# Patient Record
Sex: FEMALE | Race: WHITE | NOT HISPANIC OR LATINO
[De-identification: names, ages, dates, MRNs, and addresses within clinical notes are randomized per-mention and may not be internally consistent; named-entity substitution may affect disease eponyms.]

---

## 2023-11-17 ENCOUNTER — TRANSCRIBE ORDERS (OUTPATIENT)
Dept: OTHER | Age: 33
End: 2023-11-17

## 2023-11-17 DIAGNOSIS — Z84.89 FAMILY HISTORY OF BRAIN TUMOR: Primary | ICD-10-CM

## 2023-11-27 ENCOUNTER — PATIENT OUTREACH (OUTPATIENT)
Dept: ONCOLOGY | Facility: CLINIC | Age: 33
End: 2023-11-27

## 2023-11-27 ENCOUNTER — TRANSCRIBE ORDERS (OUTPATIENT)
Dept: ONCOLOGY | Facility: CLINIC | Age: 33
End: 2023-11-27

## 2023-11-27 DIAGNOSIS — Z84.89 FAMILY HISTORY OF BRAIN TUMOR: Primary | ICD-10-CM

## 2023-11-27 NOTE — PROGRESS NOTES
Writer received referral to Cancer Risk Management/Genetic Counseling.    Referred for:  Per neurology note: Referral to genetic counselor [facility] given bilateral vestibular schwannoma in mother,  11/9/23    Awaiting faxed referral to review for appropriate.    12/4/23  I reached out to Bessy Tolbert GC, with NF clinic, regarding where this patient should be seen.  Ideally, they would like the patient's mother to see cancer genetics for testing, but they will coordinate for visit for Manav to see Bessy and Dr. Long.    I placed a call and LM for Manav to call me back for discussion if there is possibility for her mother to be tested.    12/5/23  I was able to connect with Manav.  I discussed the recommendations for her mother to see an oncology genetic counselor.  She feels her mother would be open to this and will talk with her.  Mom lives in [place] though.  I asked that if mother goes forward with counseling/testing, that Manav will keep the team updated with any findings.  I also asked Manav to return the call that the scheduling team left. She has no other questions at this time.    Fatimah Rosales, RN, BSN  Oncology New Patient Nurse Navigator   [facility]  [phone]

## 2024-05-01 ENCOUNTER — VIRTUAL VISIT (OUTPATIENT)
Dept: PEDIATRIC HEMATOLOGY/ONCOLOGY | Facility: CLINIC | Age: 34
End: 2024-05-01
Attending: PEDIATRICS
Payer: COMMERCIAL

## 2024-05-01 DIAGNOSIS — Z71.83 ENCOUNTER FOR NONPROCREATIVE GENETIC COUNSELING AND TESTING: Primary | ICD-10-CM

## 2024-05-01 DIAGNOSIS — Z84.89 FAMILY HISTORY OF GENETIC DISEASE: Primary | ICD-10-CM

## 2024-05-01 DIAGNOSIS — Z13.71 ENCOUNTER FOR NONPROCREATIVE GENETIC COUNSELING AND TESTING: Primary | ICD-10-CM

## 2024-05-01 DIAGNOSIS — Z84.81 FAMILY HISTORY OF GENETIC DISEASE CARRIER: ICD-10-CM

## 2024-05-01 PROCEDURE — 96040 HC GENETIC COUNSELING, EACH 30 MINUTES: CPT | Mod: GT,95

## 2024-05-01 PROCEDURE — 99202 OFFICE O/P NEW SF 15 MIN: CPT | Mod: 95 | Performed by: PEDIATRICS

## 2024-05-01 NOTE — LETTER
5/1/2024      RE: Manav Mir  [address] [place] [address]     Dear Colleague,    Thank you for the opportunity to participate in the care of your patient, Manav Mir, at the [facility] at [facility]. Please see a copy of my visit note below.    HPI  Manav Mir is a 33 year old with a family history of bilateral acoustic neuromas (in mother) who presents to the clinic following a visit with Dr. Roland Jack on 11/09/2023.    Manav is here to get some clarity regarding her mother's current health situation. Her mother has been ill with a number of neurological issues recently. About 2-3 years ago, her mother began experiencing vertigo. She had difficulty walking up and down the stairs and had continuous balance issues. Shortly after, she experienced a seizure and was taken to [facility] for treatment. Her scans revealed bilateral vestibular schwannomas, although providers said they were not significant enough to cause vertigo, balance issues, and seizures. They then considered the symptoms to be aligned with meniere's disease. There are no other known tumors. She has an enlarged amygdala and deals with heightened levels of anxiety. Manav's mother currently deals with epilepsy and is searching for the appropriate medicine to provide her with some relief.     Manav herself does not have any health issues aside from migraines. 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 She takes Triptan and Nurtec for migraines relief. She notes that the migraines could be cluster headaches.     Manav's biological sister is 38 years old and currently has no health issues.     Fam/soc: She works as a pharmacist at [employer].    Impression:  FHx of bilateral acoustic neuromas     Plan:   Will try to schedule an appointment with Manav's mother.  Discussed less expensive genetic  testing through [facility].  AVS sent through [facility].      F/U with patient's mother for genetic testing.     Video start: 10:20 AM  Video end: 10:36 AM    Total time spent on the following services on the date of the encounter:  Preparing to see patient, chart review, review of outside records, Referring or communicating with other healthcare professionals, Interpretation of labs, imaging and other tests, Performing a medically appropriate examination , Documenting clinical information in the electronic or other health record  and Total time spent: 15 minutes    Tia BARRIOS am working as a scribe for and in the presence of Dr. Long on May 1, 2024. Dr. Long performed the services described in this note and the note is both complete and accurate.     Adrian Long MD

## 2024-05-01 NOTE — PROGRESS NOTES
Name:  Manav Mir  :   1990  MRN:   [id number]  Date of service: May 1, 2024  Referring Provider: Xiomara Salgado    Genetic Counseling Consultation Note - Neurofibrom[facility]    Presenting Information  A genetic counseling consultation was requested for Manav, a 33 year old female, for evaluation of possible NF2 in the context of a family history of vestibular schwannoma.  Manav attended this video visit alone.    I met with her at the request of Dr. Long to discuss personal and family medical history, review possible genetic contributions to her symptoms, and to obtain informed consent for genetic testing, if indicated. History is obtained from Manav and review of the medical record.     ----------------------------------------------------    Plan  At today's visit, we discussed the following plan:   No genetic testing recommended at today's visit. I have encouraged Manav to have her mother reach out to me as affected family member testing will have the most utility.  Other follow up and evaluation per Dr. Long.     ----------------------------------------------------    Personal History  For additional details, review note from Dr. Long dated 2024.  To summarize, Manav has a history of the following:    There is no problem list on file for this patient.    No past medical history on file.      Pregnancy/ History  No reported concerns. History of meningitis in infancy but no other illnesses/hospitalizations.     Relevant Imaging & Workup  No relevant imaging.   No relevant laboratory tests.     Previous Genetic Testing  No previous genetic testing for this patient.   No previous genetic testing in the family. Patient's parent deferred genetic testing due to insurance denial.    Social History  Father available for testing: Yes  Mother available for testing: Yes  Full sibling available for testing: Yes   Half sibling available for testing: NA    Family  "History  A standard three generation pedigree was obtained and is scanned into the medical record.      Children: Manav does not have children.  Siblings: Manav has an older sister, age 38, who is living and well. She had some infertility concerns but is otherwise well. She has two children, one of whom has epilepsy diagnosed at age 6 (he is now 12).   Paternal:  Father: Living at age 62; history of congenital unilateral hearing loss which was worsened after a serious motor vehicle accident. Vision was also impacted after that accident.   Paternal grandfather: History of the same unilateral hearing loss as Manav's father. Currently in his 80s with no reported health concerns.  Paternal grandmother: Living in her 80s; history of \"swelling around the heart\" at around age 80 after a cold/infection.   Paternal relatives: Two uncles, one aunt. The aunt has the same unilateral hearing loss as Manav's father. One of the uncles has a son who's own son (Manav's cousin's son) has autism spectrum disorder and was diagnosed with epilepsy. He is approximately 10 now.     Maternal:  Mother:  Living at age 61. Bilateral vestibular schwannomas identified on MRI. No genetic testing for this individual yet. She also has a history of parathyroidism, liver cysts, Meniere's disease, and a seizure incident in her late 50s that prompted her MRI.   Maternal grandfather:  in his late 60s due to esophageal caner; he was a smoker.   Maternal grandmother: Living at approximately 85; she has a history of kidney concerns, a weakened aorta, and COPD. She was a smoker.   Maternal relatives: Two uncles, one aunt. Aunt is in her mid-60s and has a history of skin cancer. Uncles are in their late 50s and both have some degree of developmental delay.  The younger of the two uncles has schizophrenia, bipolar disorder, and intellectual disability. He lives in a group home.   The older of the two uncles lives independently but has a history of " "developmental disability and bipolar disorder.    There are no additional reports of family members with schwannomas or other features suggestive of NF2. Consanguinity is denied.     Background Information  Every cell in our body contains a complete set of the instructions that our body needs to function.  These instructions come in the form of our DNA, or long, double-stranded chains of chemical compounds. Portions of DNA that code for a specific product or have a known function are called genes.       Since there's so much information that goes into human functioning and since every tiny cell needs a complete set, our DNA is tightly wound into compact structures called chromosomes. Most people have 46 chromosomes, with 23 coming from each parent. The 23rd pair are sometimes referred to as the \"sex chromosomes\", as they typically determine biological sex development (XX and XY). Sometimes, changes to chromosomes (like deleted pieces, duplicated pieces, or entire extra chromosomes) can cause genetic instructions to no longer work. When this occurs, a genetic disorder is possible. This type of change is called a copy number variant, because a person would not have the expected number (two) of copies of a gene.     Genetic disorders can also be caused by a single change in a single gene, like a typo in a word. These may be called point mutations, missense variants, or nonsense variants; the name usually depends on the effect the change has on the body's instructions. The genetic disorders caused by this type of change are often called single gene disorders.     Genetic changes can come from either parent or be brand new in a person. When a change is brand new in an individual, it's called a de christie change. For some conditions, both copies of a gene (both the one from mother and the one from father) need to be altered to show a trait or disease. This is called autosomal recessive inheritance. Other conditions just " require one copy of a gene to be changed in order to show a trait or disease. This is called autosomal dominant inheritance.     Neurofibromatosis Type 2 & Other Schwannomatosis  Neurofibromatosis type 2 (NF2) is a progressive autosomal dominant disorder which predisposes to schwannomas, meningiomas, and ependymomas.  The hallmark feature of NF2 is bilateral vestibular schwannomas which progressively enlarge leading to sensorineural hearing loss and deafness. Learning disabilities are uncommon.    A clinical diagnosis of NF2, initially developed by the NIH, is established by the following criteria (modified by Fox et al in 2000 and Leopoldo sanchez al in 2022):    Clinical criteria of NF2  Bilateral vestibular schwannomas (VS)  An identical NF2 pathogenic variant in at least 2 anatomically distinct NF2-related tumors (schwannoma, meningioma, ependymoma)  2 major or 1 major with 2 minor criteria:  Major - unilateral VS, first-degree relative other than sibling with NF2-related schwannomatosis, 2+ meningiomas (single meningioma is a minor criteria), or NF2 pathogenic variant in unaffected tissue such as blood  Minor - Ependymoma, schwannoma, meningioma (multiple meningiomas are a MAJOR) can be counted as distinct minor criteria (ie two distinct schwannomas = 2 minor criteria). Juvenile subcapsular or cortical cataract, retinal hamartoma, epiretinal membrane in individuals under age 40.    Occasionally, schwannomas can develop that are not related to NF2. Other genes involved in schwannoma development include SMARCB1 and LZTR1. Meningiomas can develop in individuals with SMARCB1-related schwannomatosis, but have not been observed in LZTR1-related schwannomatosis.     Clinical criteria of schwannomatosis (SMARCB1, LZTR1)   Patient must meet at least 1 of the following criteria:    At least one pathologically confirmed schwannoma or hybrid nerve sheath tumor and a SMARCB1 or LZTR1 pathogenic variant in an unaffected tissue  (such as blood)  A shared SMARCB1 or LZTR1 pathogenic variant in 2 schwannomas or hybrid nerve sheath tumors        As the average age of onset of findings in individuals with NF2 is 18 to 24 years, ongoing surveillance for tumors and hearing loss is needed to allow for early diagnosis and intervention.  Because NF2 is considered to be an adult-onset disease, it may be under-recognized in children, in whom skin tumors or ocular findings may be the initial manifestations.      MRI screening for at risk individuals is typically not recommended until age 10, unless needed for diagnostic purposes, to assess concerning symptoms or when the individual is known to be a member of a severely affected family.    Initial symptoms of vestibular schwannoma include tinnitus, hearing loss, and balance dysfunction. With time, vestibular tumors extend medially into the cerebellar pontine angle and, if left untreated, cause compression of the brain stem and hydrocephalus. Schwannomas may also develop on other cranial and peripheral nerves, with sensory nerves more frequently affected than motor nerves.     At least two-thirds of individuals with NF2 develop spinal tumors, which are often the most devastating and difficult aspects of this condition to manage. The most common spinal tumors are schwannomas, which usually originate within the intravertebral canal on the dorsal root and extend both medially and laterally, taking the shape of a dumbbell. Intramedullary tumors of the spinal cord such as astrocytoma and ependymoma occur in 5-33% of individuals with NF2. Most individuals with spinal cord involvement have multiple tumors identified on imaging studies, but these tumors can remain asymptomatic.    Approximately half of individuals with NF2 develop maeningiomas. Most are intracranial; however, spinal meningiomas also occur. Meningiomas in the orbit may compress the optic nerve and result in visual loss. Meningioma may be the  presenting feature of NF2, particularly in childhood.    One-third of individuals with NF2 have decreased visual acuity in one or both eyes. Posterior subcapsular lens opacities, rarely progressing to a visually significant cataract,  are the most common ocular finding. Lens opacities may appear prior to the onset of symptoms from vestibular schwannoma and can be seen in children. Retinal hamartoma and epiretinal membrane are seen in up to one-third of individuals with NF2.     An increasingly recognized feature of NF2 is a mononeuropathy occurring particularly in childhood and frequently presenting as a facial palsy that usually only partially recovers, a third nerve palsy or a foot or hand drop. A progressive polyneuropathy of adulthood not directly related to tumor masses is also being increasingly described.    NF2 is caused by a pathogenic variant, or mutation, in the NF2 gene.  Genetic testing for NF2 is available and currently detects the disease-causing mutation in 93% of individuals with a clinical diagnosis of NF2.  Therefore, if a pathogenic variant is identified, a diagnosis is confirmed.  However, if no pathogenic variant is found, a diagnosis of NF2 cannot be completely excluded.  Somatic mosaicism is common in NF2 and therefore, testing is usually most informative in non- patients or in tumor sample.    As this is an autosomal dominant condition, affected individuals have a 50% chance to pass the condition to their offspring.  There is significant variability in NF2.    Genetic Testing  Genetic testing can take many forms. We can look at chromosomes to see if there are any pieces missing or extra (microarray test) or see how chromosomes are arranged (karyotype test). We can also look at specific genes to see if there are changes to the sequence causing the instruction to no longer work (sequencing, deletion or duplication analysis). Often, we look at multiple genes at once (a panel test).  Sometimes, we look at every known gene within a person via a test called whole exome sequencing (AISHA). Typically, with schwannomas, we analyze sequence and deletion/duplication of three genes: NF2, LZTR1, SMARCB1.     At this time, testing will be minimally informative without a positive genetic change identified in Manav's mother. I have requested that Manav share my contact information with her mother; we can attempt to coordinate testing for her following that.    Manav expressed an excellent understanding of this information and agreed to the plan as set forth by Dr. Long and I and detailed at the beginning of this note. It was a pleasure meeting with Manav today. She vocalized understanding of the information we discussed and her questions and concerns were addressed. She has been provided with my contact information should any questions arise regarding our visit or plan moving forward. In total, I spent 25 minutes in face-to-face counseling with this patient.     Bessy Tolbert MS, [facility]  Genetic Counselor - [facility]  Phone: [phone]  Email: [email or web address]    Lab results may be automatically released via [employer].  Department protocol is to hold genetic testing results until we have reviewed them. We will then contact the family directly to disclose the results and ensure they receive a copy of the report. This protocol was reviewed with the family, who were in agreement to hold the results for genetics review and direct contact.    References  Corrina R, Leopoldo S, Yg A, et al. LZTR1- and SMARCB1-Related Schwannomatosis. 2018 Mar 8 [Updated 2023 Jul 27]. In: Murray Villa MP, GM, Haylee CASANOVA, et al., editors. Lingua.ly  [Internet]. [place] ([place]): [facility]; 9836-7581. Available from: https://www.ncbi.nlm.nih.gov/books/WUU481289/    Gerber JIMENEZ. NF2-Related Schwannomatosis. 1998 Oct 14 [Updated 2023 Apr 20]. In: Murray Villa MP, GM, Haylee CASANOVA et al., editors. OpenSparks[employer]ews   [Internet]. [place] ([place]): [facility]; 9415-3438. Available from: https://www.ncbi.nlm.nih.gov/books/NCY6913/    Leopoldo SR, Keisha MIX, Charu E, Jimbo P, Leoncio RA, Sixto HERNANDEZ, Jose D, Chin R, Edi MJ, Perez JM, Allyson M, Alejandro DH, Joy CO, Jarrod M, Kehrer-Sawatzki H, Cleo BR, Vani VF, Albert M, Alfred L, Levi KA, Alexey V, Schnic E, Pratik MJ, Surinder A, Jj DA, Ullrich NJ, Malachi D, Jaquelin K, Chidi K; International Consensus Group on Neurofibromatosis Diagnostic Criteria (I-NF-DC); Wendy SM, Franco P, Gerber DG. Updated diagnostic criteria and nomenclature for neurofibromatosis type 2 and schwannomatosis: An international consensus recommendation. [employer] Med. 2022 Sep;24(9):0585-3120. doi: 10.1016/j.gim.2022.05.007. Epub 2022 Jun 9. PMID: [id number].

## 2024-05-01 NOTE — LETTER
2024      RE: Manav Mir  [address] [facility] [address]     Dear Colleague,    Thank you for the opportunity to participate in the care of your patient, Manav Mir, at the [facility] at [facility]. Please see a copy of my visit note below.    Name:  Manav Mir  :   1990  MRN:   [id number]  Date of service: May 1, 2024  Referring Provider: Xiomara Salgado    Genetic Counseling Consultation Note - [facility]    Presenting Information  A genetic counseling consultation was requested for Manav, a 33 year old female, for evaluation of possible NF2 in the context of a family history of vestibular schwannoma.  Manav attended this video visit alone.    I met with her at the request of Dr. Long to discuss personal and family medical history, review possible genetic contributions to her symptoms, and to obtain informed consent for genetic testing, if indicated. History is obtained from Manav and review of the medical record.     ----------------------------------------------------    Plan  At today's visit, we discussed the following plan:   No genetic testing recommended at today's visit. I have encouraged Manav to have her mother reach out to me as affected family member testing will have the most utility.  Other follow up and evaluation per Dr. Long.     ----------------------------------------------------    Personal History  For additional details, review note from Dr. Long dated 2024.  To summarize, Manav has a history of the following:    There is no problem list on file for this patient.    No past medical history on file.      Pregnancy/ History  No reported concerns. History of meningitis in infancy but no other illnesses/hospitalizations.     Relevant Imaging & Workup  No relevant imaging.   No relevant laboratory tests.     Previous Genetic  "Testing  No previous genetic testing for this patient.   No previous genetic testing in the family. Patient's parent deferred genetic testing due to insurance denial.    Social History  Father available for testing: Yes  Mother available for testing: Yes  Full sibling available for testing: Yes   Half sibling available for testing: NA    Family History  A standard three generation pedigree was obtained and is scanned into the medical record.      Children: Manav does not have children.  Siblings: Manav has an older sister, age 38, who is living and well. She had some infertility concerns but is otherwise well. She has two children, one of whom has epilepsy diagnosed at age 6 (he is now 12).   Paternal:  Father: Living at age 62; history of congenital unilateral hearing loss which was worsened after a serious motor vehicle accident. Vision was also impacted after that accident.   Paternal grandfather: History of the same unilateral hearing loss as Manav's father. Currently in his 80s with no reported health concerns.  Paternal grandmother: Living in her 80s; history of \"swelling around the heart\" at around age 80 after a cold/infection.   Paternal relatives: Two uncles, one aunt. The aunt has the same unilateral hearing loss as aMnav's father. One of the uncles has a son who's own son (Manav's cousin's son) has autism spectrum disorder and was diagnosed with epilepsy. He is approximately 10 now.     Maternal:  Mother:  Living at age 61. Bilateral vestibular schwannomas identified on MRI. No genetic testing for this individual yet. She also has a history of parathyroidism, liver cysts, Meniere's disease, and a seizure incident in her late 50s that prompted her MRI.   Maternal grandfather:  in his late 60s due to esophageal caner; he was a smoker.   Maternal grandmother: Living at approximately 85; she has a history of kidney concerns, a weakened aorta, and COPD. She was a smoker.   Maternal relatives: Two " "uncles, one aunt. Aunt is in her mid-60s and has a history of skin cancer. Uncles are in their late 50s and both have some degree of developmental delay.  The younger of the two uncles has schizophrenia, bipolar disorder, and intellectual disability. He lives in a group home.   The older of the two uncles lives independently but has a history of developmental disability and bipolar disorder.    There are no additional reports of family members with schwannomas or other features suggestive of NF2. Consanguinity is denied.     Background Information  Every cell in our body contains a complete set of the instructions that our body needs to function.  These instructions come in the form of our DNA, or long, double-stranded chains of chemical compounds. Portions of DNA that code for a specific product or have a known function are called genes.       Since there's so much information that goes into human functioning and since every tiny cell needs a complete set, our DNA is tightly wound into compact structures called chromosomes. Most people have 46 chromosomes, with 23 coming from each parent. The 23rd pair are sometimes referred to as the \"sex chromosomes\", as they typically determine biological sex development (XX and XY). Sometimes, changes to chromosomes (like deleted pieces, duplicated pieces, or entire extra chromosomes) can cause genetic instructions to no longer work. When this occurs, a genetic disorder is possible. This type of change is called a copy number variant, because a person would not have the expected number (two) of copies of a gene.     Genetic disorders can also be caused by a single change in a single gene, like a typo in a word. These may be called point mutations, missense variants, or nonsense variants; the name usually depends on the effect the change has on the body's instructions. The genetic disorders caused by this type of change are often called single gene disorders.     Genetic changes " can come from either parent or be brand new in a person. When a change is brand new in an individual, it's called a de christie change. For some conditions, both copies of a gene (both the one from mother and the one from father) need to be altered to show a trait or disease. This is called autosomal recessive inheritance. Other conditions just require one copy of a gene to be changed in order to show a trait or disease. This is called autosomal dominant inheritance.     Neurofibromatosis Type 2 & Other Schwannomatosis  Neurofibromatosis type 2 (NF2) is a progressive autosomal dominant disorder which predisposes to schwannomas, meningiomas, and ependymomas.  The hallmark feature of NF2 is bilateral vestibular schwannomas which progressively enlarge leading to sensorineural hearing loss and deafness. Learning disabilities are uncommon.    A clinical diagnosis of NF2, initially developed by the NIH, is established by the following criteria (modified by Fox et al in 2000 and Leopoldo sanchez al in 2022):    Clinical criteria of NF2  Bilateral vestibular schwannomas (VS)  An identical NF2 pathogenic variant in at least 2 anatomically distinct NF2-related tumors (schwannoma, meningioma, ependymoma)  2 major or 1 major with 2 minor criteria:  Major - unilateral VS, first-degree relative other than sibling with NF2-related schwannomatosis, 2+ meningiomas (single meningioma is a minor criteria), or NF2 pathogenic variant in unaffected tissue such as blood  Minor - Ependymoma, schwannoma, meningioma (multiple meningiomas are a MAJOR) can be counted as distinct minor criteria (ie two distinct schwannomas = 2 minor criteria). Juvenile subcapsular or cortical cataract, retinal hamartoma, epiretinal membrane in individuals under age 40.    Occasionally, schwannomas can develop that are not related to NF2. Other genes involved in schwannoma development include SMARCB1 and LZTR1. Meningiomas can develop in individuals with SMARCB1-related  schwannomatosis, but have not been observed in LZTR1-related schwannomatosis.     Clinical criteria of schwannomatosis (SMARCB1, LZTR1)   Patient must meet at least 1 of the following criteria:    At least one pathologically confirmed schwannoma or hybrid nerve sheath tumor and a SMARCB1 or LZTR1 pathogenic variant in an unaffected tissue (such as blood)  A shared SMARCB1 or LZTR1 pathogenic variant in 2 schwannomas or hybrid nerve sheath tumors        As the average age of onset of findings in individuals with NF2 is 18 to 24 years, ongoing surveillance for tumors and hearing loss is needed to allow for early diagnosis and intervention.  Because NF2 is considered to be an adult-onset disease, it may be under-recognized in children, in whom skin tumors or ocular findings may be the initial manifestations.      MRI screening for at risk individuals is typically not recommended until age 10, unless needed for diagnostic purposes, to assess concerning symptoms or when the individual is known to be a member of a severely affected family.    Initial symptoms of vestibular schwannoma include tinnitus, hearing loss, and balance dysfunction. With time, vestibular tumors extend medially into the cerebellar pontine angle and, if left untreated, cause compression of the brain stem and hydrocephalus. Schwannomas may also develop on other cranial and peripheral nerves, with sensory nerves more frequently affected than motor nerves.     At least two-thirds of individuals with NF2 develop spinal tumors, which are often the most devastating and difficult aspects of this condition to manage. The most common spinal tumors are schwannomas, which usually originate within the intravertebral canal on the dorsal root and extend both medially and laterally, taking the shape of a dumbbell. Intramedullary tumors of the spinal cord such as astrocytoma and ependymoma occur in 5-33% of individuals with NF2. Most individuals with spinal cord  involvement have multiple tumors identified on imaging studies, but these tumors can remain asymptomatic.    Approximately half of individuals with NF2 develop maeningiomas. Most are intracranial; however, spinal meningiomas also occur. Meningiomas in the orbit may compress the optic nerve and result in visual loss. Meningioma may be the presenting feature of NF2, particularly in childhood.    One-third of individuals with NF2 have decreased visual acuity in one or both eyes. Posterior subcapsular lens opacities, rarely progressing to a visually significant cataract,  are the most common ocular finding. Lens opacities may appear prior to the onset of symptoms from vestibular schwannoma and can be seen in children. Retinal hamartoma and epiretinal membrane are seen in up to one-third of individuals with NF2.     An increasingly recognized feature of NF2 is a mononeuropathy occurring particularly in childhood and frequently presenting as a facial palsy that usually only partially recovers, a third nerve palsy or a foot or hand drop. A progressive polyneuropathy of adulthood not directly related to tumor masses is also being increasingly described.    NF2 is caused by a pathogenic variant, or mutation, in the NF2 gene.  Genetic testing for NF2 is available and currently detects the disease-causing mutation in 93% of individuals with a clinical diagnosis of NF2.  Therefore, if a pathogenic variant is identified, a diagnosis is confirmed.  However, if no pathogenic variant is found, a diagnosis of NF2 cannot be completely excluded.  Somatic mosaicism is common in NF2 and therefore, testing is usually most informative in non- patients or in tumor sample.    As this is an autosomal dominant condition, affected individuals have a 50% chance to pass the condition to their offspring.  There is significant variability in NF2.    Genetic Testing  Genetic testing can take many forms. We can look at chromosomes to see if  there are any pieces missing or extra (microarray test) or see how chromosomes are arranged (karyotype test). We can also look at specific genes to see if there are changes to the sequence causing the instruction to no longer work (sequencing, deletion or duplication analysis). Often, we look at multiple genes at once (a panel test). Sometimes, we look at every known gene within a person via a test called whole exome sequencing (AISHA). Typically, with schwannomas, we analyze sequence and deletion/duplication of three genes: NF2, LZTR1, SMARCB1.     At this time, testing will be minimally informative without a positive genetic change identified in Manav's mother. I have requested that Manav share my contact information with her mother; we can attempt to coordinate testing for her following that.    Manav expressed an excellent understanding of this information and agreed to the plan as set forth by Dr. Long and I and detailed at the beginning of this note. It was a pleasure meeting with Manav today. She vocalized understanding of the information we discussed and her questions and concerns were addressed. She has been provided with my contact information should any questions arise regarding our visit or plan moving forward. In total, I spent 25 minutes in face-to-face counseling with this patient.     Bsesy Tolbert MS, [facility]  Genetic Counselor - [facility]  Phone: [phone]  Email: [email or web address]    Lab results may be automatically released via [employer].  Department protocol is to hold genetic testing results until we have reviewed them. We will then contact the family directly to disclose the results and ensure they receive a copy of the report. This protocol was reviewed with the family, who were in agreement to hold the results for genetics review and direct contact.    References  Corrina R, Leopoldo S, Yg A, et al. LZTR1- and SMARCB1-Related Schwannomatosis. 2018 Mar 8 [Updated 2023 Jul 27]. In:  Allen ALEXANDER, Murray STEVE, Haylee CASANOVA, et al., editors. [employer]  [Internet]. [facility]): [facility]; 6376-5093. Available from: https://www.ncbi.nlm.nih.gov/books/URQ098528/    Gerber JIMENEZ. NF2-Related Schwannomatosis. 1998 Oct 14 [Updated 2023 Apr 20]. In: Murray Villa MP, GM, Haylee CASANOVA, et al., editors. [employer]  [Internet]. [facility]): [facility]; 1830-7684. Available from: https://www.ncbi.nlm.nih.gov/books/JWJ9407/    Leopoldo SR, Keisha L, Charu E, Jimbo P, Leoncio RA, Sixto DAVID, Jose D, Chin R, Edi MJ, Perez JM, Allyson M, Alejandro DH, Hanalka CO, Jarrod M, Kehrer-Chalino H, Cleo BR, Vani VF, Albert M, Alfred L, Levi KA, Alexey V, Schnic E, Christie MJ, Jose Luis-Mollyimruth A, Gardner DA, Ullrich NJ, Malachi D, Jaquelin K, Chidi K; International Consensus Group on Neurofibromatosis Diagnostic Criteria (I-NF-DC); Wendy OREILLY, Franco P, Gerber JIMENEZ. Updated diagnostic criteria and nomenclature for neurofibromatosis type 2 and schwannomatosis: An international consensus recommendation. [employer]. 2022 Sep;24(9):2584-0604. doi: 10.1016/j.gim.2022.05.007. Epub 2022 Jun 9. PMID: [id number].      Please do not hesitate to contact me if you have any questions/concerns.     Sincerely,       Bessy Tolbert GC

## 2024-05-01 NOTE — PROGRESS NOTES
HPI  Manav Mir is a 33 year old with a family history of bilateral acoustic neuromas (in mother) who presents to the clinic following a visit with Dr. Roland Jack on 11/09/2023.    Manav is here to get some clarity regarding her mother's current health situation. Her mother has been ill with a number of neurological issues recently. About 2-3 years ago, her mother began experiencing vertigo. She had difficulty walking up and down the stairs and had continuous balance issues. Shortly after, she experienced a seizure and was taken to [facility] for treatment. Her scans revealed bilateral vestibular schwannomas, although providers said they were not significant enough to cause vertigo, balance issues, and seizures. They then considered the symptoms to be aligned with meniere's disease. There are no other known tumors. She has an enlarged amygdala and deals with heightened levels of anxiety. Manav's mother currently deals with epilepsy and is searching for the appropriate medicine to provide her with some relief.     Manav herself does not have any health issues aside from migraines. 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 She takes Triptan and Nurtec for migraines relief. She notes that the migraines could be cluster headaches.     Manav's biological sister is 38 years old and currently has no health issues.     Fam/soc: She works as a pharmacist at [employer].    Impression:  FHx of bilateral acoustic neuromas     Plan:   Will try to schedule an appointment with Manav's mother.  Discussed less expensive genetic testing through [facility].  AVS sent through [employer].      F/U with patient's mother for genetic testing.     Video start: 10:20 AM  Video end: 10:36 AM    Total time spent on the following services on the date of the encounter:  Preparing to see patient, chart review, review of outside records, Referring or communicating with other healthcare  professionals, Interpretation of labs, imaging and other tests, Performing a medically appropriate examination , Documenting clinical information in the electronic or other health record  and Total time spent: 15 minutes    I, Tia Ayon, am working as a scribe for and in the presence of Dr. Long on May 1, 2024. Dr. Long performed the services described in this note and the note is both complete and accurate.     Adrian Long MD

## 2024-05-01 NOTE — PATIENT INSTRUCTIONS
Plan:  Recommend testing mother as she is the only family member affected.   Reach out to us if yout mother would be interested in NF2 testing.

## 2024-05-19 ENCOUNTER — HEALTH MAINTENANCE LETTER (OUTPATIENT)
Age: 34
End: 2024-05-19

## 2025-06-08 ENCOUNTER — HEALTH MAINTENANCE LETTER (OUTPATIENT)
Age: 35
End: 2025-06-08